# Patient Record
Sex: FEMALE | Race: WHITE | NOT HISPANIC OR LATINO | URBAN - METROPOLITAN AREA
[De-identification: names, ages, dates, MRNs, and addresses within clinical notes are randomized per-mention and may not be internally consistent; named-entity substitution may affect disease eponyms.]

---

## 2017-11-01 ENCOUNTER — OUTPATIENT (OUTPATIENT)
Dept: OUTPATIENT SERVICES | Facility: HOSPITAL | Age: 82
LOS: 1 days | End: 2017-11-01
Payer: MEDICARE

## 2017-11-01 PROCEDURE — G9001: CPT

## 2017-11-29 ENCOUNTER — INPATIENT (INPATIENT)
Facility: HOSPITAL | Age: 82
LOS: 4 days | Discharge: HOSPICE | End: 2017-12-04
Attending: INTERNAL MEDICINE | Admitting: INTERNAL MEDICINE
Payer: MEDICARE

## 2017-11-29 VITALS
RESPIRATION RATE: 23 BRPM | DIASTOLIC BLOOD PRESSURE: 71 MMHG | WEIGHT: 121.92 LBS | HEIGHT: 63 IN | HEART RATE: 85 BPM | SYSTOLIC BLOOD PRESSURE: 121 MMHG | OXYGEN SATURATION: 97 % | TEMPERATURE: 98 F

## 2017-11-29 LAB
ALBUMIN SERPL ELPH-MCNC: 3.2 G/DL — LOW (ref 3.3–5)
ALP SERPL-CCNC: 92 U/L — SIGNIFICANT CHANGE UP (ref 40–120)
ALT FLD-CCNC: 25 U/L — SIGNIFICANT CHANGE UP (ref 12–78)
ANION GAP SERPL CALC-SCNC: 5 MMOL/L — SIGNIFICANT CHANGE UP (ref 5–17)
APTT BLD: 30.2 SEC — SIGNIFICANT CHANGE UP (ref 27.5–37.4)
AST SERPL-CCNC: 14 U/L — LOW (ref 15–37)
BASE EXCESS BLDA CALC-SCNC: 7.9 MMOL/L — HIGH (ref -2–2)
BASOPHILS # BLD AUTO: 0.1 K/UL — SIGNIFICANT CHANGE UP (ref 0–0.2)
BASOPHILS NFR BLD AUTO: 1 % — SIGNIFICANT CHANGE UP (ref 0–2)
BILIRUB SERPL-MCNC: 0.8 MG/DL — SIGNIFICANT CHANGE UP (ref 0.2–1.2)
BLOOD GAS COMMENTS: SIGNIFICANT CHANGE UP
BLOOD GAS COMMENTS: SIGNIFICANT CHANGE UP
BLOOD GAS SOURCE: SIGNIFICANT CHANGE UP
BUN SERPL-MCNC: 28 MG/DL — HIGH (ref 7–23)
CALCIUM SERPL-MCNC: 9.5 MG/DL — SIGNIFICANT CHANGE UP (ref 8.5–10.1)
CHLORIDE SERPL-SCNC: 103 MMOL/L — SIGNIFICANT CHANGE UP (ref 96–108)
CK MB BLD-MCNC: 2.3 % — SIGNIFICANT CHANGE UP (ref 0–3.5)
CK MB CFR SERPL CALC: 0.7 NG/ML — SIGNIFICANT CHANGE UP (ref 0.5–3.6)
CK SERPL-CCNC: 30 U/L — SIGNIFICANT CHANGE UP (ref 26–192)
CO2 SERPL-SCNC: 33 MMOL/L — HIGH (ref 22–31)
CREAT SERPL-MCNC: 0.62 MG/DL — SIGNIFICANT CHANGE UP (ref 0.5–1.3)
EOSINOPHIL # BLD AUTO: 0.1 K/UL — SIGNIFICANT CHANGE UP (ref 0–0.5)
EOSINOPHIL NFR BLD AUTO: 1.7 % — SIGNIFICANT CHANGE UP (ref 0–6)
GLUCOSE SERPL-MCNC: 98 MG/DL — SIGNIFICANT CHANGE UP (ref 70–99)
HCO3 BLDA-SCNC: 34 MMOL/L — HIGH (ref 21–29)
HCT VFR BLD CALC: 38.5 % — SIGNIFICANT CHANGE UP (ref 34.5–45)
HGB BLD-MCNC: 12.8 G/DL — SIGNIFICANT CHANGE UP (ref 11.5–15.5)
HOROWITZ INDEX BLDA+IHG-RTO: 54 — SIGNIFICANT CHANGE UP
INR BLD: 1.04 RATIO — SIGNIFICANT CHANGE UP (ref 0.88–1.16)
LYMPHOCYTES # BLD AUTO: 1.9 K/UL — SIGNIFICANT CHANGE UP (ref 1–3.3)
LYMPHOCYTES # BLD AUTO: 24.7 % — SIGNIFICANT CHANGE UP (ref 13–44)
MCHC RBC-ENTMCNC: 28.2 PG — SIGNIFICANT CHANGE UP (ref 27–34)
MCHC RBC-ENTMCNC: 33.2 GM/DL — SIGNIFICANT CHANGE UP (ref 32–36)
MCV RBC AUTO: 84.8 FL — SIGNIFICANT CHANGE UP (ref 80–100)
MONOCYTES # BLD AUTO: 0.5 K/UL — SIGNIFICANT CHANGE UP (ref 0–0.9)
MONOCYTES NFR BLD AUTO: 7.1 % — SIGNIFICANT CHANGE UP (ref 2–14)
NEUTROPHILS # BLD AUTO: 5.1 K/UL — SIGNIFICANT CHANGE UP (ref 1.8–7.4)
NEUTROPHILS NFR BLD AUTO: 65.5 % — SIGNIFICANT CHANGE UP (ref 43–77)
NT-PROBNP SERPL-SCNC: 339 PG/ML — SIGNIFICANT CHANGE UP (ref 0–450)
PCO2 BLDA: 56 MMHG — HIGH (ref 32–46)
PH BLD: 7.4 — SIGNIFICANT CHANGE UP (ref 7.35–7.45)
PLATELET # BLD AUTO: 172 K/UL — SIGNIFICANT CHANGE UP (ref 150–400)
PO2 BLDA: 126 MMHG — HIGH (ref 74–108)
POTASSIUM SERPL-MCNC: 4.2 MMOL/L — SIGNIFICANT CHANGE UP (ref 3.5–5.3)
POTASSIUM SERPL-SCNC: 4.2 MMOL/L — SIGNIFICANT CHANGE UP (ref 3.5–5.3)
PROT SERPL-MCNC: 6.6 GM/DL — SIGNIFICANT CHANGE UP (ref 6–8.3)
PROTHROM AB SERPL-ACNC: 11.3 SEC — SIGNIFICANT CHANGE UP (ref 9.8–12.7)
RBC # BLD: 4.54 M/UL — SIGNIFICANT CHANGE UP (ref 3.8–5.2)
RBC # FLD: 12.9 % — SIGNIFICANT CHANGE UP (ref 11–15)
SAO2 % BLDA: 99 % — HIGH (ref 92–96)
SODIUM SERPL-SCNC: 141 MMOL/L — SIGNIFICANT CHANGE UP (ref 135–145)
TROPONIN I SERPL-MCNC: <.015 NG/ML — SIGNIFICANT CHANGE UP (ref 0.01–0.04)
WBC # BLD: 7.8 K/UL — SIGNIFICANT CHANGE UP (ref 3.8–10.5)
WBC # FLD AUTO: 7.8 K/UL — SIGNIFICANT CHANGE UP (ref 3.8–10.5)

## 2017-11-29 PROCEDURE — 99285 EMERGENCY DEPT VISIT HI MDM: CPT

## 2017-11-29 PROCEDURE — 71010: CPT | Mod: 26

## 2017-11-29 RX ORDER — IPRATROPIUM/ALBUTEROL SULFATE 18-103MCG
3 AEROSOL WITH ADAPTER (GRAM) INHALATION ONCE
Qty: 0 | Refills: 0 | Status: COMPLETED | OUTPATIENT
Start: 2017-11-29 | End: 2017-11-29

## 2017-11-29 RX ADMIN — Medication 3 MILLILITER(S): at 22:45

## 2017-11-29 RX ADMIN — Medication 40 MILLIGRAM(S): at 22:21

## 2017-11-29 RX ADMIN — Medication 3 MILLILITER(S): at 22:17

## 2017-11-29 NOTE — ED ADULT TRIAGE NOTE - CHIEF COMPLAINT QUOTE
as per family" patient has a snoring sound in her throat and had difficulty breathing earlier in the day"

## 2017-11-29 NOTE — ED PROVIDER NOTE - OBJECTIVE STATEMENT
Pertinent PMH/PSH/FHx/SHx and Review of Systems contained within:  Patient presents to the ED for   difficulty breathing.  Patient [place] speaking, family at bedside note that she has been having shortness of breath since earlier today, sitting up with increased nasal flaring and "snoring" noisy respirations.  Family and aid at bedside say that she has been having a cough for the last few days, patient has been getting robitussin.  No other factors out of the ordinary.      Relevant PMHx/SHx/SOCHx/FAMH:  Advanced dementia  Patient family denies EtOH/tobacco/illicit substance use.  PMD: Dr. Keyes

## 2017-11-29 NOTE — ED PROVIDER NOTE - MEDICAL DECISION MAKING DETAILS
Patient with stridorous breathing.  VSS, labs, CT's reviewed, left vallecula abnormal.  Needs ENT, speech and swallow.  Given recent cough, antibiotic coverage given initially, CT chest results reviewed.  Patient is to be admitted to the hospital and the case was discussed with the admitting physician.  Any changes in plan, additional imaging/labs, and further work up will be at the discretion of the admitting physician. Patient with stridorous breathing.  VSS, labs, CT's reviewed, left vallecula abnormal.  Needs ENT, speech and swallow, however per family patient has known scar tissue of left vallecula.  Given recent cough, antibiotic coverage given initially, CT chest results reviewed, suspicious for infection.  Patient is to be admitted to the hospital and the case was discussed with the admitting physician.  Any changes in plan, additional imaging/labs, and further work up will be at the discretion of the admitting physician.

## 2017-11-29 NOTE — ED PROVIDER NOTE - PHYSICAL EXAMINATION
Gen: Alert, sitting up, snorting breathing  Head: NC, AT, EOMI, normal lids/conjunctiva  ENT: patent oropharynx without erythema/exudate, uvula midline  Neck: +supple, no tenderness/meningismus/JVD, +Trachea midline  Pulm: Bilateral BS, increased resp effort, no wheeze, + retractions  CV: RRR, no M/R/G, +dist pulses  Abd: soft, NT/ND, +BS, no hepatosplenomegaly  Mskel: no edema/erythema/cyanosis  Skin: no rash, warm/dry  Neuro: no sensory/motor deficits, CN 2-12 intact

## 2017-11-30 DIAGNOSIS — R06.89 OTHER ABNORMALITIES OF BREATHING: ICD-10-CM

## 2017-11-30 DIAGNOSIS — F03.90 UNSPECIFIED DEMENTIA WITHOUT BEHAVIORAL DISTURBANCE: ICD-10-CM

## 2017-11-30 DIAGNOSIS — E89.0 POSTPROCEDURAL HYPOTHYROIDISM: Chronic | ICD-10-CM

## 2017-11-30 DIAGNOSIS — R69 ILLNESS, UNSPECIFIED: ICD-10-CM

## 2017-11-30 LAB
ALBUMIN SERPL ELPH-MCNC: 3 G/DL — LOW (ref 3.3–5)
ALP SERPL-CCNC: 84 U/L — SIGNIFICANT CHANGE UP (ref 40–120)
ALT FLD-CCNC: 24 U/L — SIGNIFICANT CHANGE UP (ref 12–78)
ANION GAP SERPL CALC-SCNC: 4 MMOL/L — LOW (ref 5–17)
AST SERPL-CCNC: 13 U/L — LOW (ref 15–37)
BILIRUB SERPL-MCNC: 0.9 MG/DL — SIGNIFICANT CHANGE UP (ref 0.2–1.2)
BUN SERPL-MCNC: 23 MG/DL — SIGNIFICANT CHANGE UP (ref 7–23)
CALCIUM SERPL-MCNC: 8.8 MG/DL — SIGNIFICANT CHANGE UP (ref 8.5–10.1)
CHLORIDE SERPL-SCNC: 108 MMOL/L — SIGNIFICANT CHANGE UP (ref 96–108)
CO2 SERPL-SCNC: 34 MMOL/L — HIGH (ref 22–31)
CREAT SERPL-MCNC: 0.55 MG/DL — SIGNIFICANT CHANGE UP (ref 0.5–1.3)
GLUCOSE SERPL-MCNC: 136 MG/DL — HIGH (ref 70–99)
HCT VFR BLD CALC: 38.8 % — SIGNIFICANT CHANGE UP (ref 34.5–45)
HGB BLD-MCNC: 12.4 G/DL — SIGNIFICANT CHANGE UP (ref 11.5–15.5)
MCHC RBC-ENTMCNC: 28 PG — SIGNIFICANT CHANGE UP (ref 27–34)
MCHC RBC-ENTMCNC: 31.9 GM/DL — LOW (ref 32–36)
MCV RBC AUTO: 88 FL — SIGNIFICANT CHANGE UP (ref 80–100)
PLATELET # BLD AUTO: 152 K/UL — SIGNIFICANT CHANGE UP (ref 150–400)
POTASSIUM SERPL-MCNC: 4 MMOL/L — SIGNIFICANT CHANGE UP (ref 3.5–5.3)
POTASSIUM SERPL-SCNC: 4 MMOL/L — SIGNIFICANT CHANGE UP (ref 3.5–5.3)
PROT SERPL-MCNC: 6.2 GM/DL — SIGNIFICANT CHANGE UP (ref 6–8.3)
RBC # BLD: 4.42 M/UL — SIGNIFICANT CHANGE UP (ref 3.8–5.2)
RBC # FLD: 13.7 % — SIGNIFICANT CHANGE UP (ref 11–15)
SODIUM SERPL-SCNC: 146 MMOL/L — HIGH (ref 135–145)
WBC # BLD: 9.3 K/UL — SIGNIFICANT CHANGE UP (ref 3.8–10.5)
WBC # FLD AUTO: 9.3 K/UL — SIGNIFICANT CHANGE UP (ref 3.8–10.5)

## 2017-11-30 PROCEDURE — 70450 CT HEAD/BRAIN W/O DYE: CPT | Mod: 26

## 2017-11-30 PROCEDURE — 71250 CT THORAX DX C-: CPT | Mod: 26

## 2017-11-30 PROCEDURE — 70490 CT SOFT TISSUE NECK W/O DYE: CPT | Mod: 26

## 2017-11-30 RX ORDER — ENOXAPARIN SODIUM 100 MG/ML
30 INJECTION SUBCUTANEOUS EVERY 24 HOURS
Qty: 0 | Refills: 0 | Status: DISCONTINUED | OUTPATIENT
Start: 2017-11-30 | End: 2017-12-04

## 2017-11-30 RX ORDER — ACETYLCYSTEINE 200 MG/ML
10 VIAL (ML) MISCELLANEOUS THREE TIMES A DAY
Qty: 0 | Refills: 0 | Status: DISCONTINUED | OUTPATIENT
Start: 2017-11-30 | End: 2017-11-30

## 2017-11-30 RX ORDER — SODIUM CHLORIDE 9 MG/ML
1000 INJECTION INTRAMUSCULAR; INTRAVENOUS; SUBCUTANEOUS ONCE
Qty: 0 | Refills: 0 | Status: COMPLETED | OUTPATIENT
Start: 2017-11-30 | End: 2017-11-30

## 2017-11-30 RX ORDER — ACETYLCYSTEINE 200 MG/ML
2 VIAL (ML) MISCELLANEOUS EVERY 6 HOURS
Qty: 0 | Refills: 0 | Status: DISCONTINUED | OUTPATIENT
Start: 2017-11-30 | End: 2017-12-04

## 2017-11-30 RX ORDER — ACETYLCYSTEINE 200 MG/ML
10 VIAL (ML) MISCELLANEOUS EVERY 6 HOURS
Qty: 0 | Refills: 0 | Status: DISCONTINUED | OUTPATIENT
Start: 2017-11-30 | End: 2017-11-30

## 2017-11-30 RX ORDER — MORPHINE SULFATE 50 MG/1
5 CAPSULE, EXTENDED RELEASE ORAL
Qty: 0 | Refills: 0 | Status: DISCONTINUED | OUTPATIENT
Start: 2017-11-30 | End: 2017-12-02

## 2017-11-30 RX ORDER — IPRATROPIUM/ALBUTEROL SULFATE 18-103MCG
3 AEROSOL WITH ADAPTER (GRAM) INHALATION EVERY 6 HOURS
Qty: 0 | Refills: 0 | Status: DISCONTINUED | OUTPATIENT
Start: 2017-11-30 | End: 2017-12-04

## 2017-11-30 RX ADMIN — Medication 3 MILLILITER(S): at 11:10

## 2017-11-30 RX ADMIN — Medication 40 MILLIGRAM(S): at 06:22

## 2017-11-30 RX ADMIN — ENOXAPARIN SODIUM 30 MILLIGRAM(S): 100 INJECTION SUBCUTANEOUS at 06:22

## 2017-11-30 RX ADMIN — Medication 40 MILLIGRAM(S): at 22:02

## 2017-11-30 RX ADMIN — Medication 40 MILLIGRAM(S): at 13:26

## 2017-11-30 RX ADMIN — Medication 2 MILLILITER(S): at 17:40

## 2017-11-30 RX ADMIN — Medication 3 MILLILITER(S): at 17:40

## 2017-11-30 RX ADMIN — SODIUM CHLORIDE 1000 MILLILITER(S): 9 INJECTION INTRAMUSCULAR; INTRAVENOUS; SUBCUTANEOUS at 02:31

## 2017-11-30 RX ADMIN — Medication 3 MILLILITER(S): at 06:04

## 2017-11-30 NOTE — H&P ADULT - NSHPPHYSICALEXAM_GEN_ALL_CORE
PHYSICAL EXAM:    GENERAL: NAD, well-groomed, well-developed  HEAD:  Atraumatic, Normocephalic  EYES: EOMI, PERRLA, conjunctiva and sclera clear  ENMT: No tonsillar erythema, exudates,  NECK: Supple, No JVD, Normal thyroid  NERVOUS SYSTEM:  somnolent, poorly  arouseable  CHEST/LUNG: Clear  bilaterally; No rales, rhonchi, wheezing, or rubs  HEART: Regular rate and rhythm; No murmurs, rubs, or gallops  ABDOMEN: Soft, Nontender, Nondistended; no  masses Bowel sounds present  EXTREMITIES:  + Peripheral Pulses, No clubbing, cyanosis, or edema  LYMPH: No lymphadenopathy noted   RECTAL: deferreds noted   SKIN: No rashes or lesions

## 2017-11-30 NOTE — CHART NOTE - NSCHARTNOTEFT_GEN_A_CORE
Medicine Hospitalist PA    Asked to place admission orders. Pt seen and examined in ED. Family at bedside. Pt awake but lethargic. As per family, pt came in for sob that started today. Pt "snoring", as per family pt was walking, talking with walker last week, however this last week notably seemed weaker. Eating relatively okay as per family however today much weaker, unable to respond to verbal commands.     Vital Signs Last 24 Hrs  T(C): 37.3 (29 Nov 2017 23:01), Max: 37.3 (29 Nov 2017 23:01)  T(F): 99.2 (29 Nov 2017 23:01), Max: 99.2 (29 Nov 2017 23:01)  HR: 88 (29 Nov 2017 23:01) (85 - 88)  BP: 163/76 (29 Nov 2017 23:01) (121/71 - 163/76)  RR: 20 (29 Nov 2017 23:01) (20 - 23)  SpO2: 99% (29 Nov 2017 23:01) (97% - 99%)    General: awake but lethargic, not responding to verbal commands  CV: S1 S2  Resp: stridor, wheezing  Abd: soft, NT/ND, BS+  Ext: pulses intact, no LE edema    A/P: 86 YOF with PMH x of Dementia now admitted with AMS.  - Admit to Med/Surg  - Levaquin 500mg qdaily  - Duonebs  - Aspiration Precautions  - NPO until speech and swallow  - Pending CT head  - Pending CT chest  - Pending CT neck for stridor    Discussed with Dr. Keyes who agrees with plan. Medicine Hospitalist PA    Asked to place admission orders. Pt seen and examined in ED. Family at bedside. Pt awake but lethargic. As per family, pt came in for sob that started today. Pt "snoring", as per family pt was walking, talking with walker last week, however this last week notably seemed weaker. Eating relatively okay as per family however today much weaker, unable to respond to verbal commands.     Vital Signs Last 24 Hrs  T(C): 37.3 (29 Nov 2017 23:01), Max: 37.3 (29 Nov 2017 23:01)  T(F): 99.2 (29 Nov 2017 23:01), Max: 99.2 (29 Nov 2017 23:01)  HR: 88 (29 Nov 2017 23:01) (85 - 88)  BP: 163/76 (29 Nov 2017 23:01) (121/71 - 163/76)  RR: 20 (29 Nov 2017 23:01) (20 - 23)  SpO2: 99% (29 Nov 2017 23:01) (97% - 99%)    General: awake but lethargic, not responding to verbal commands  CV: S1 S2  Resp: stridor, wheezing  Abd: soft, NT/ND, BS+  Ext: pulses intact, no LE edema    A/P: 86 YOF DNR/DNI with PMHx of Dementia now admitted with AMS.  - Admit to Med/Surg  - Levaquin 500mg qdaily  - Duonebs  - Aspiration Precautions  - NPO until speech and swallow  - Pending CT head  - Pending CT chest  - Pending CT neck for stridor    Discussed with Dr. Keyes who agrees with plan.

## 2017-11-30 NOTE — H&P ADULT - HISTORY OF PRESENT ILLNESS
patient  with  end  stage  Alzheimer's  reported  with  difficulty  breathing stridor   at  home  evaluated  in  ER  admitted  for  further w/u  and  treatment

## 2017-11-30 NOTE — H&P ADULT - NSHPLABSRESULTS_GEN_ALL_CORE
LABS:                        12.4   9.3   )-----------( 152      ( 30 Nov 2017 08:03 )             38.8     11-30    146<H>  |  108  |  23  ----------------------------<  136<H>  4.0   |  34<H>  |  0.55    Ca    8.8      30 Nov 2017 08:03    TPro  6.2  /  Alb  3.0<L>  /  TBili  0.9  /  DBili  x   /  AST  13<L>  /  ALT  24  /  AlkPhos  84  11-30    PT/INR - ( 29 Nov 2017 22:44 )   PT: 11.3 sec;   INR: 1.04 ratio         PTT - ( 29 Nov 2017 22:44 )  PTT:30.2 sec

## 2017-12-01 DIAGNOSIS — I10 ESSENTIAL (PRIMARY) HYPERTENSION: ICD-10-CM

## 2017-12-01 RX ORDER — BACITRACIN ZINC 500 UNIT/G
1 OINTMENT IN PACKET (EA) TOPICAL
Qty: 0 | Refills: 0 | Status: DISCONTINUED | OUTPATIENT
Start: 2017-12-01 | End: 2017-12-04

## 2017-12-01 RX ADMIN — Medication 2 MILLILITER(S): at 00:09

## 2017-12-01 RX ADMIN — Medication 40 MILLIGRAM(S): at 14:56

## 2017-12-01 RX ADMIN — Medication 40 MILLIGRAM(S): at 21:58

## 2017-12-01 RX ADMIN — Medication 2 MILLILITER(S): at 17:05

## 2017-12-01 RX ADMIN — Medication 2 MILLILITER(S): at 11:07

## 2017-12-01 RX ADMIN — Medication 3 MILLILITER(S): at 17:05

## 2017-12-01 RX ADMIN — Medication 3 MILLILITER(S): at 05:28

## 2017-12-01 RX ADMIN — Medication 40 MILLIGRAM(S): at 05:19

## 2017-12-01 RX ADMIN — Medication 2 MILLILITER(S): at 05:28

## 2017-12-01 RX ADMIN — Medication 3 MILLILITER(S): at 00:10

## 2017-12-01 RX ADMIN — Medication 3 MILLILITER(S): at 11:07

## 2017-12-01 RX ADMIN — ENOXAPARIN SODIUM 30 MILLIGRAM(S): 100 INJECTION SUBCUTANEOUS at 05:20

## 2017-12-01 NOTE — SWALLOW BEDSIDE ASSESSMENT ADULT - PHARYNGEAL PHASE
Decreased laryngeal elevation/Multiple swallows Multiple swallows/Decreased laryngeal elevation Within functional limits

## 2017-12-01 NOTE — CONSULT NOTE ADULT - SUBJECTIVE AND OBJECTIVE BOX
Patient is a 86y old  Female who presents with a chief complaint of difficulty breathing  change  in  mental  status (30 Nov 2017 08:48)    HPI: Patient  with  end  stage  Alzheimer's  reported  with  difficulty  breathing, stridor at  home - evaluated  in  ER,  admitted  for  further w/u  and  treatment (30 Nov 2017 08:48)    Treated with steroids, bronchdilators, mucolytics, O2 and ABx empirically.    REVIEW OF SYSTEMS as above  Constitutional - No fever, No weight loss, No fatigue  HEENT - No neck pain  Respiratory - No cough, No shortness of breath  Cardiovascular - No chest pain, No palpitations  Gastrointestinal - No abdominal pain, No nausea, No vomiting  Genitourinary - No dysuria, No frequency  Neurological - No headaches, No loss of strength, No numbness, No tremors  Skin - No lesions   Endocrine - No temperature intolerance  Musculoskeletal - No joint pain  Psychiatric - No depression, No anxiety    PAST MEDICAL & SURGICAL HISTORY  HTN (hypertension)  Dementia  H/O partial thyroidectomy  H/O thyroidectomy    FAMILY HISTORY: noncontributory    SOCHX: Lives at home with   Np tobacco,  -  alcohol    ALLERGIES  No Known Allergies    MEDICATIONS reviewed  MEDICATIONS  (STANDING):  acetylcysteine 10% Inhalation 2 milliLiter(s) Inhalation every 6 hours  ALBUTerol/ipratropium for Nebulization 3 milliLiter(s) Nebulizer every 6 hours  enoxaparin Injectable 30 milliGRAM(s) SubCutaneous every 24 hours  levoFLOXacin IVPB 500 milliGRAM(s) IV Intermittent every 24 hours  methylPREDNISolone sodium succinate Injectable 40 milliGRAM(s) IV Push every 8 hours    MEDICATIONS  (PRN):  morphine Concentrate 5 milliGRAM(s) Oral four times a day PRN Moderate Pain (4 - 6)    --------------------------------------------------------------------  VITALS  T(C): 36.2 (12-01-17 @ 11:47), Max: 37.7 (11-30-17 @ 23:50)  HR: 78 (12-01-17 @ 11:47) (63 - 94)  BP: 131/86 (12-01-17 @ 11:47) (94/42 - 131/86)  RR: 17 (12-01-17 @ 11:47) (17 - 18)  SpO2: 98% (12-01-17 @ 11:47) (94% - 99%)  Wt(kg): -- 55.6 kg    PHYSICAL EXAM BMI 21.7  Constitutional - NAD, Comfortable  HEENT - NCAT, EOMI  Neck - Supple, functional ROM  Cardiovascular - RRR  Abdomen - Soft, Not tender  Extremities - Functional range of motion   Neurologic -                    Cognitive - Awake, Alert, Oriented     Speech Intact     Language  Intact     Motor - No focal deficits     Sensory - Intact to LT     Reflexes - DTR Intact, No primitive reflexive     Psychiatric - Mood stable, Affect WNL    RECENT LABS reviewed  CBC Full  -  ( 30 Nov 2017 08:03 )  WBC Count : 9.3 K/uL  Hemoglobin : 12.4 g/dL  Hematocrit : 38.8 %  Platelet Count - Automated : 152 K/uL  Mean Cell Volume : 88.0 fl  Mean Cell Hemoglobin : 28.0 pg  Mean Cell Hemoglobin Concentration : 31.9 gm/dL    11-30    146<H>  |  108  |  23  ----------------------------<  136<H>  4.0   |  34<H>  |  0.55    Ca    8.8      30 Nov 2017 08:03    TPro  6.2  /  Alb  3.0<L>  /  TBili  0.9  /  DBili  x   /  AST  13<L>  /  ALT  24  /  AlkPhos  84  11-30    IMAGING reviewed:  CXR - Right lower lobe clustered groundglass and tree-in-bud nodular  opacities - infection versus inflammatory mucoid impacted distal airways.    Head CT - no acute findings    STx rec puree consistency with thin liquids    FUNCTIONAL HISTORY    CURRENT FUNCTIONAL STATUS    IMPRESSION:     PLAN: Will follow - recommendations will depend upon clinical and functional course. Patient is a 86y old  Female who presents with a chief complaint of difficulty breathing  change  in  mental  status (30 Nov 2017 08:48)    HPI: Patient  with  end  stage  Alzheimer's  reported  with  difficulty  breathing, stridor at  home - evaluated  in  ER,  admitted  for  further w/u  and  treatment (30 Nov 2017 08:48)    Treated with steroids, bronchdilators, mucolytics, O2 and ABx empirically.    REVIEW OF SYSTEMS limited by dementia - as above.  Relative states she is breathing and looking much better today.    PAST MEDICAL & SURGICAL HISTORY  HTN (hypertension)  Dementia  H/O partial thyroidectomy  H/O thyroidectomy    FAMILY HISTORY: noncontributory    SOCHX: Lives at home with family - speaks Libyan, but demented - a relative translated. Was able to participate in ADLs, ambulated in house with walker.  No tobacco,  -  alcohol    ALLERGIES  No Known Allergies    MEDICATIONS reviewed  MEDICATIONS  (STANDING):  acetylcysteine 10% Inhalation 2 milliLiter(s) Inhalation every 6 hours  ALBUTerol/ipratropium for Nebulization 3 milliLiter(s) Nebulizer every 6 hours  enoxaparin Injectable 30 milliGRAM(s) SubCutaneous every 24 hours  levoFLOXacin IVPB 500 milliGRAM(s) IV Intermittent every 24 hours  methylPREDNISolone sodium succinate Injectable 40 milliGRAM(s) IV Push every 8 hours    MEDICATIONS  (PRN):  morphine Concentrate 5 milliGRAM(s) Oral four times a day PRN Moderate Pain (4 - 6)    --------------------------------------------------------------------  VITALS  T(C): 36.2 (12-01-17 @ 11:47), Max: 37.7 (11-30-17 @ 23:50)  HR: 78 (12-01-17 @ 11:47) (63 - 94)  BP: 131/86 (12-01-17 @ 11:47) (94/42 - 131/86)  RR: 17 (12-01-17 @ 11:47) (17 - 18)  SpO2: 98% (12-01-17 @ 11:47) (94% - 99%)  Wt(kg): -- 55.6 kg    PHYSICAL EXAM BMI 21.7  Constitutional - NAD, Comfortable  HEENT - NCAT, EOMI, O2 NC (new)  Neck - Supple, functional ROM  Cardiovascular - RRR  Abdomen - Soft, Not tender  Extremities - Grossly functional range of motion - testing limited.  Neurologic -                    Cognitive - Awake, Alert, follows simple directions in [place]     Speech grossly Intact     Language grossly  Intact     Motor - No focal deficits appreciated     Sensory - unreliable     Reflexes - DTR absent BLEs     Psychiatric - Mood stable    RECENT LABS reviewed  CBC Full  -  ( 30 Nov 2017 08:03 )  WBC Count : 9.3 K/uL  Hemoglobin : 12.4 g/dL  Hematocrit : 38.8 %  Platelet Count - Automated : 152 K/uL  Mean Cell Volume : 88.0 fl  Mean Cell Hemoglobin : 28.0 pg  Mean Cell Hemoglobin Concentration : 31.9 gm/dL    11-30    146<H>  |  108  |  23  ----------------------------<  136<H>  4.0   |  34<H>  |  0.55    Ca    8.8      30 Nov 2017 08:03    TPro  6.2  /  Alb  3.0<L>  /  TBili  0.9  /  DBili  x   /  AST  13<L>  /  ALT  24  /  AlkPhos  84  11-30    IMAGING reviewed:  CXR - Right lower lobe clustered groundglass and tree-in-bud nodular  opacities - infection versus inflammatory mucoid impacted distal airways.    Head CT - no acute findings    STx rec puree consistency with thin liquids    FUNCTIONAL HISTORY was able to amb with RW    CURRENT FUNCTIONAL STATUS to be determined    IMPRESSION: 86 F with dementia but was able to ambulate, being treated for difficulty breathing - improving with empirical treatment.    PLAN: Will follow - recommendations will depend upon clinical and functional course.

## 2017-12-01 NOTE — CONSULT NOTE ADULT - SUBJECTIVE AND OBJECTIVE BOX
Vascular Attending:   Pt seen and examined      HPI:  patient  with  end  stage  Alzheimer's  reported  with  difficulty  breathing stridor   at  home  evaluated  in  ER  admitted  for  further w/u  and  treatment (30 Nov 2017 08:48)      PAST MEDICAL & SURGICAL HISTORY:  HTN (hypertension)  Dementia  H/O partial thyroidectomy        MEDICATIONS  (STANDING):  acetylcysteine 10% Inhalation 2 milliLiter(s) Inhalation every 6 hours  ALBUTerol/ipratropium for Nebulization 3 milliLiter(s) Nebulizer every 6 hours  enoxaparin Injectable 30 milliGRAM(s) SubCutaneous every 24 hours  levoFLOXacin IVPB 500 milliGRAM(s) IV Intermittent every 24 hours  methylPREDNISolone sodium succinate Injectable 40 milliGRAM(s) IV Push every 8 hours    MEDICATIONS  (PRN):  morphine Concentrate 5 milliGRAM(s) Oral four times a day PRN Moderate Pain (4 - 6)      Allergies    No Known Allergies    Intolerances        SOCIAL HISTORY:      Vital Signs Last 24 Hrs  T(C): 36.2 (01 Dec 2017 11:47), Max: 37.7 (30 Nov 2017 23:50)  T(F): 97.2 (01 Dec 2017 11:47), Max: 99.8 (30 Nov 2017 23:50)  HR: 78 (01 Dec 2017 11:47) (63 - 94)  BP: 131/86 (01 Dec 2017 11:47) (94/42 - 131/86)  BP(mean): --  RR: 17 (01 Dec 2017 11:47) (17 - 18)  SpO2: 98% (01 Dec 2017 11:47) (94% - 99%)    P/E:-   CAROTIDS:- Bilateral carotids with no Bruits. No scars of previous catheterisation.  UPPER EXTREMITIES:- Bilateral radial artery pulses are normal and no ischemia of the Hands. No edema of the arms.  ABDOMEN:- No pulsatile mass in the abdomen and no ascites.  LOWER EXTREMITIES:- Bilateral LE with No Edema and no CVI, No varicose veins, no ulcers.The arterial pulse are examined with palpation and Dopplers and the findings are as follows,    Wounds:- The pt has following wounds and measurements.  Sacrum:- Stage 4, 1x1 cm on coccyx, probes to bone and has no tunneling and no undermining, no cellulitis.  NO slough and no need for the debridement.      LABS:                        12.4   9.3   )-----------( 152      ( 30 Nov 2017 08:03 )             38.8     11-30    146<H>  |  108  |  23  ----------------------------<  136<H>  4.0   |  34<H>  |  0.55    Ca    8.8      30 Nov 2017 08:03    TPro  6.2  /  Alb  3.0<L>  /  TBili  0.9  /  DBili  x   /  AST  13<L>  /  ALT  24  /  AlkPhos  84  11-30    PT/INR - ( 29 Nov 2017 22:44 )   PT: 11.3 sec;   INR: 1.04 ratio         PTT - ( 29 Nov 2017 22:44 )  PTT:30.2 sec      RADIOLOGY & ADDITIONAL STUDIES    Impression and Plan: stage 4 coccygeal pressure ulcer,  Plan--- off loading, foam dressing, No debridement needed.Poor healing expected due to poor overall med condition.

## 2017-12-01 NOTE — SWALLOW BEDSIDE ASSESSMENT ADULT - COMMENTS
CT CHEST: 11/30/2017: IMPRESSION: Right lower lobe clustered groundglass and tree-in-bud nodular opacities. Differential includes infection versus inflammatory mucoid impacted distal airways.    CXR: 11/29/2017: no infiltrates are seen.    CT BRAIN: 11/29/2017: IMPRESSION: No acute intracranial bleeding, mass effect, or shift. Volume loss and chronic microvascular ischemic changes.

## 2017-12-01 NOTE — SWALLOW BEDSIDE ASSESSMENT ADULT - SLP GENERAL OBSERVATIONS
[place] [facility] pt received at bedside alert and cooperative. Pt is Greek speaking. Pt aide able to translate information. Pt aide reports pt stating desire to consume PO. During swallow evaluation, able to follow one step directions when repeated. Pt consistency requested "more" during evaluation.

## 2017-12-01 NOTE — SWALLOW BEDSIDE ASSESSMENT ADULT - SWALLOW EVAL: DIAGNOSIS
Pt presented at bedside with reduced cognition, secondary to dementia. Pt oropharyngeal stages marked by prolonged A-P transport, oral holding, delayed swallow trigger, decreased laryngeal excursion, multiple swallows. No overt s/s aspiration noted.

## 2017-12-01 NOTE — SWALLOW BEDSIDE ASSESSMENT ADULT - SLP PERTINENT HISTORY OF CURRENT PROBLEM
Difficulty breathing  change  in  mental  status; patient  with  end  stage  Alzheimer's  reported  with  difficulty  breathing stridor   at  home  evaluated  in  ER  admitted  for  further w/u  and  treatment

## 2017-12-01 NOTE — PROGRESS NOTE ADULT - SUBJECTIVE AND OBJECTIVE BOX
INTERVAL HPI/OVERNIGHT EVENTS:        REVIEW OF SYSTEMS:  CONSTITUTIONAL:  patient  alert smiling    MEDICATION:  acetylcysteine 10% Inhalation 2 milliLiter(s) Inhalation every 6 hours  ALBUTerol/ipratropium for Nebulization 3 milliLiter(s) Nebulizer every 6 hours  enoxaparin Injectable 30 milliGRAM(s) SubCutaneous every 24 hours  levoFLOXacin IVPB 500 milliGRAM(s) IV Intermittent every 24 hours  methylPREDNISolone sodium succinate Injectable 40 milliGRAM(s) IV Push every 8 hours  morphine Concentrate 5 milliGRAM(s) Oral four times a day PRN    Vital Signs Last 24 Hrs  T(C): 37.5 (01 Dec 2017 05:37), Max: 37.7 (30 Nov 2017 23:50)  T(F): 99.5 (01 Dec 2017 05:37), Max: 99.8 (30 Nov 2017 23:50)  HR: 78 (01 Dec 2017 06:09) (63 - 94)  BP: 120/72 (01 Dec 2017 05:37) (94/42 - 129/66)  BP(mean): --  RR: 18 (01 Dec 2017 05:37) (18 - 18)  SpO2: 97% (01 Dec 2017 06:09) (94% - 99%)    PHYSICAL EXAM:  GENERAL: NAD, well-groomed, well-developed  EYES:  conjunctiva and sclera clear  ENMT:  Moist mucous membranes,   NECK: Supple, No JVD, Normal thyroid  NERVOUS SYSTEM:  Alert moves  all  extr  CHEST/LUNG: Clear    HEART: Regular rate and rhythm; No murmurs, rubs, or gallops  ABDOMEN: Soft, Nontender, Nondistended; Bowel sounds present  EXTREMITIES:  no  edema no  tenderness  SKIN: No rashes   LABS:                        12.4   9.3   )-----------( 152      ( 30 Nov 2017 08:03 )             38.8     11-30    146<H>  |  108  |  23  ----------------------------<  136<H>  4.0   |  34<H>  |  0.55    Ca    8.8      30 Nov 2017 08:03    TPro  6.2  /  Alb  3.0<L>  /  TBili  0.9  /  DBili  x   /  AST  13<L>  /  ALT  24  /  AlkPhos  84  11-30    PT/INR - ( 29 Nov 2017 22:44 )   PT: 11.3 sec;   INR: 1.04 ratio         PTT - ( 29 Nov 2017 22:44 )  PTT:30.2 sec    CAPILLARY BLOOD GLUCOSE          RADIOLOGY & ADDITIONAL TESTS:    Imaging reports  Personally Reviewed:  [x ] YES  [ ] NO    Consultant(s) Notes Reviewed:  [ ] YES  [ ] NO    Care Discussed with Consultants/Other Providers [x ] YES  [ ] NO

## 2017-12-01 NOTE — SWALLOW BEDSIDE ASSESSMENT ADULT - ORAL PHASE
Delayed oral transit time/Decreased anterior-posterior movement of the bolus Delayed oral transit time Within functional limits

## 2017-12-01 NOTE — SWALLOW BEDSIDE ASSESSMENT ADULT - SWALLOW EVAL: RECOMMENDED FEEDING/EATING TECHNIQUES
allow for swallow between intakes/alternate food with liquid/small sips/bites/maintain upright posture during/after eating for 30 mins/no straws/crush medication (when feasible)/position upright (90 degrees)

## 2017-12-02 LAB — PREALB SERPL-MCNC: 14 MG/DL — LOW (ref 18–45)

## 2017-12-02 RX ORDER — MORPHINE SULFATE 50 MG/1
5 CAPSULE, EXTENDED RELEASE ORAL
Qty: 0 | Refills: 0 | Status: DISCONTINUED | OUTPATIENT
Start: 2017-12-02 | End: 2017-12-04

## 2017-12-02 RX ORDER — LACTULOSE 10 G/15ML
20 SOLUTION ORAL ONCE
Qty: 0 | Refills: 0 | Status: COMPLETED | OUTPATIENT
Start: 2017-12-02 | End: 2017-12-02

## 2017-12-02 RX ADMIN — ENOXAPARIN SODIUM 30 MILLIGRAM(S): 100 INJECTION SUBCUTANEOUS at 06:00

## 2017-12-02 RX ADMIN — Medication 3 MILLILITER(S): at 05:13

## 2017-12-02 RX ADMIN — MORPHINE SULFATE 5 MILLIGRAM(S): 50 CAPSULE, EXTENDED RELEASE ORAL at 12:45

## 2017-12-02 RX ADMIN — Medication 3 MILLILITER(S): at 11:07

## 2017-12-02 RX ADMIN — Medication 1 APPLICATION(S): at 06:00

## 2017-12-02 RX ADMIN — MORPHINE SULFATE 5 MILLIGRAM(S): 50 CAPSULE, EXTENDED RELEASE ORAL at 11:47

## 2017-12-02 RX ADMIN — MORPHINE SULFATE 5 MILLIGRAM(S): 50 CAPSULE, EXTENDED RELEASE ORAL at 18:03

## 2017-12-02 RX ADMIN — Medication 2 MILLILITER(S): at 17:40

## 2017-12-02 RX ADMIN — Medication 3 MILLILITER(S): at 00:00

## 2017-12-02 RX ADMIN — LACTULOSE 20 GRAM(S): 10 SOLUTION ORAL at 14:17

## 2017-12-02 RX ADMIN — MORPHINE SULFATE 5 MILLIGRAM(S): 50 CAPSULE, EXTENDED RELEASE ORAL at 17:06

## 2017-12-02 RX ADMIN — Medication 3 MILLILITER(S): at 23:06

## 2017-12-02 RX ADMIN — Medication 3 MILLILITER(S): at 17:40

## 2017-12-02 RX ADMIN — Medication 2 MILLILITER(S): at 05:14

## 2017-12-02 RX ADMIN — Medication 2 MILLILITER(S): at 11:07

## 2017-12-02 RX ADMIN — Medication 1 APPLICATION(S): at 17:08

## 2017-12-02 RX ADMIN — Medication 30 MILLIGRAM(S): at 10:10

## 2017-12-02 RX ADMIN — Medication 40 MILLIGRAM(S): at 06:00

## 2017-12-02 RX ADMIN — Medication 2 MILLILITER(S): at 23:06

## 2017-12-02 RX ADMIN — Medication 2 MILLILITER(S): at 00:01

## 2017-12-03 LAB
ALBUMIN SERPL ELPH-MCNC: 2.9 G/DL — LOW (ref 3.3–5)
ALP SERPL-CCNC: 70 U/L — SIGNIFICANT CHANGE UP (ref 40–120)
ALT FLD-CCNC: 22 U/L — SIGNIFICANT CHANGE UP (ref 12–78)
ANION GAP SERPL CALC-SCNC: 4 MMOL/L — LOW (ref 5–17)
AST SERPL-CCNC: 12 U/L — LOW (ref 15–37)
BILIRUB SERPL-MCNC: 0.5 MG/DL — SIGNIFICANT CHANGE UP (ref 0.2–1.2)
BUN SERPL-MCNC: 29 MG/DL — HIGH (ref 7–23)
CALCIUM SERPL-MCNC: 8.7 MG/DL — SIGNIFICANT CHANGE UP (ref 8.5–10.1)
CHLORIDE SERPL-SCNC: 105 MMOL/L — SIGNIFICANT CHANGE UP (ref 96–108)
CO2 SERPL-SCNC: 35 MMOL/L — HIGH (ref 22–31)
CREAT SERPL-MCNC: 0.44 MG/DL — LOW (ref 0.5–1.3)
GLUCOSE SERPL-MCNC: 84 MG/DL — SIGNIFICANT CHANGE UP (ref 70–99)
HCT VFR BLD CALC: 38.2 % — SIGNIFICANT CHANGE UP (ref 34.5–45)
HGB BLD-MCNC: 12.1 G/DL — SIGNIFICANT CHANGE UP (ref 11.5–15.5)
MCHC RBC-ENTMCNC: 27.1 PG — SIGNIFICANT CHANGE UP (ref 27–34)
MCHC RBC-ENTMCNC: 31.7 GM/DL — LOW (ref 32–36)
MCV RBC AUTO: 85.5 FL — SIGNIFICANT CHANGE UP (ref 80–100)
PLATELET # BLD AUTO: 162 K/UL — SIGNIFICANT CHANGE UP (ref 150–400)
POTASSIUM SERPL-MCNC: 3.3 MMOL/L — LOW (ref 3.5–5.3)
POTASSIUM SERPL-SCNC: 3.3 MMOL/L — LOW (ref 3.5–5.3)
PROT SERPL-MCNC: 6.1 GM/DL — SIGNIFICANT CHANGE UP (ref 6–8.3)
RBC # BLD: 4.46 M/UL — SIGNIFICANT CHANGE UP (ref 3.8–5.2)
RBC # FLD: 13.6 % — SIGNIFICANT CHANGE UP (ref 11–15)
SODIUM SERPL-SCNC: 144 MMOL/L — SIGNIFICANT CHANGE UP (ref 135–145)
WBC # BLD: 7.9 K/UL — SIGNIFICANT CHANGE UP (ref 3.8–10.5)
WBC # FLD AUTO: 7.9 K/UL — SIGNIFICANT CHANGE UP (ref 3.8–10.5)

## 2017-12-03 RX ORDER — POTASSIUM CHLORIDE 20 MEQ
40 PACKET (EA) ORAL EVERY 6 HOURS
Qty: 0 | Refills: 0 | Status: COMPLETED | OUTPATIENT
Start: 2017-12-03 | End: 2017-12-04

## 2017-12-03 RX ORDER — POTASSIUM CHLORIDE 20 MEQ
40 PACKET (EA) ORAL EVERY 4 HOURS
Qty: 0 | Refills: 0 | Status: DISCONTINUED | OUTPATIENT
Start: 2017-12-03 | End: 2017-12-03

## 2017-12-03 RX ORDER — MEMANTINE HYDROCHLORIDE 10 MG/1
10 TABLET ORAL
Qty: 0 | Refills: 0 | Status: DISCONTINUED | OUTPATIENT
Start: 2017-12-03 | End: 2017-12-04

## 2017-12-03 RX ORDER — ASCORBIC ACID 60 MG
500 TABLET,CHEWABLE ORAL DAILY
Qty: 0 | Refills: 0 | Status: DISCONTINUED | OUTPATIENT
Start: 2017-12-03 | End: 2017-12-04

## 2017-12-03 RX ORDER — ZINC SULFATE TAB 220 MG (50 MG ZINC EQUIVALENT) 220 (50 ZN) MG
220 TAB ORAL DAILY
Qty: 0 | Refills: 0 | Status: DISCONTINUED | OUTPATIENT
Start: 2017-12-03 | End: 2017-12-04

## 2017-12-03 RX ORDER — DONEPEZIL HYDROCHLORIDE 10 MG/1
10 TABLET, FILM COATED ORAL AT BEDTIME
Qty: 0 | Refills: 0 | Status: DISCONTINUED | OUTPATIENT
Start: 2017-12-03 | End: 2017-12-04

## 2017-12-03 RX ADMIN — Medication 1 APPLICATION(S): at 05:28

## 2017-12-03 RX ADMIN — Medication 30 MILLIGRAM(S): at 05:29

## 2017-12-03 RX ADMIN — Medication 3 MILLILITER(S): at 23:20

## 2017-12-03 RX ADMIN — Medication 3 MILLILITER(S): at 06:01

## 2017-12-03 RX ADMIN — Medication 500 MILLIGRAM(S): at 12:22

## 2017-12-03 RX ADMIN — Medication 1 APPLICATION(S): at 18:00

## 2017-12-03 RX ADMIN — DONEPEZIL HYDROCHLORIDE 10 MILLIGRAM(S): 10 TABLET, FILM COATED ORAL at 22:01

## 2017-12-03 RX ADMIN — Medication 2 MILLILITER(S): at 06:01

## 2017-12-03 RX ADMIN — ENOXAPARIN SODIUM 30 MILLIGRAM(S): 100 INJECTION SUBCUTANEOUS at 05:29

## 2017-12-03 RX ADMIN — MEMANTINE HYDROCHLORIDE 10 MILLIGRAM(S): 10 TABLET ORAL at 18:00

## 2017-12-03 RX ADMIN — Medication 40 MILLIEQUIVALENT(S): at 16:09

## 2017-12-03 RX ADMIN — Medication 2 MILLILITER(S): at 11:06

## 2017-12-03 RX ADMIN — Medication 3 MILLILITER(S): at 11:06

## 2017-12-03 RX ADMIN — Medication 2 MILLILITER(S): at 23:20

## 2017-12-03 RX ADMIN — ZINC SULFATE TAB 220 MG (50 MG ZINC EQUIVALENT) 220 MILLIGRAM(S): 220 (50 ZN) TAB at 12:22

## 2017-12-03 RX ADMIN — Medication 3 MILLILITER(S): at 17:09

## 2017-12-03 RX ADMIN — Medication 2 MILLILITER(S): at 17:09

## 2017-12-03 NOTE — PHYSICAL THERAPY INITIAL EVALUATION ADULT - ADDITIONAL COMMENTS
As per private home health aide (Marine) by bedside, patient, prior to admission, was able to ambulate short distances indoors c rolling walker. Has a hospital bed at home, no patient lift, has a wheelchair. Has 5 stair steps to enter, but not used stairs for a long time as has not left home.

## 2017-12-03 NOTE — PHYSICAL THERAPY INITIAL EVALUATION ADULT - SKIN INTEGRITY
PT wound care initial evaluation performed with all wounds documented in flowsheet 2 4.0 A&I/pressure ulcer/healed wound/ulcers (specify)

## 2017-12-03 NOTE — PHYSICAL THERAPY INITIAL EVALUATION ADULT - GENERAL OBSERVATIONS, REHAB EVAL
Patient supine in bed c stable vital signs. PT wound care initial evaluation performed. PT wound care initial evaluation performed with all wounds documented in flowsheet 2 4.0 A&I.

## 2017-12-03 NOTE — PHYSICAL THERAPY INITIAL EVALUATION ADULT - IMPAIRMENTS FOUND, PT EVAL
aerobic capacity/endurance/arousal, attention, and cognition/decreased midline orientation/cognitive impairment/gait, locomotion, and balance/muscle strength/ventilation and respiration/gas exchange/integumentary integrity/gross motor/neuromotor development and sensory integration/joint integrity and mobility/ROM/sensory integrity

## 2017-12-03 NOTE — PHYSICAL THERAPY INITIAL EVALUATION ADULT - MODIFIED CLINICAL TEST OF SENSORY INTEGRATION IN BALANCE TEST
Barthel Index: Feeding Score _0__, Bathing Score _0__, Grooming Score _0__, Dressing Score _0__, Bowels Score _0__, Bladder Score _0__, Toilet Score _0__, Transfers Score _5__, Mobility Score _0__, Stairs Score _0__,     Total Score _5__

## 2017-12-03 NOTE — PHYSICAL THERAPY INITIAL EVALUATION ADULT - CRITERIA FOR SKILLED THERAPEUTIC INTERVENTIONS
impairments found/anticipated equipment needs at discharge/anticipated discharge recommendation/risk reduction/prevention/functional limitations in following categories/rehab potential

## 2017-12-03 NOTE — PROGRESS NOTE ADULT - SUBJECTIVE AND OBJECTIVE BOX
INTERVAL HPI/OVERNIGHT EVENTS:        REVIEW OF SYSTEMS:  CONSTITUTIONAL: alert  comfortable      MEDICATION:  acetylcysteine 10% Inhalation 2 milliLiter(s) Inhalation every 6 hours  ALBUTerol/ipratropium for Nebulization 3 milliLiter(s) Nebulizer every 6 hours  BACItracin   Ointment 1 Application(s) Topical two times a day  enoxaparin Injectable 30 milliGRAM(s) SubCutaneous every 24 hours  levoFLOXacin  Tablet 500 milliGRAM(s) Oral every 24 hours  morphine Concentrate 5 milliGRAM(s) Oral four times a day PRN  predniSONE   Tablet 30 milliGRAM(s) Oral daily    Vital Signs Last 24 Hrs  T(C): 37.1 (03 Dec 2017 06:14), Max: 37.6 (02 Dec 2017 17:14)  T(F): 98.8 (03 Dec 2017 06:14), Max: 99.7 (02 Dec 2017 17:14)  HR: 95 (03 Dec 2017 06:31) (60 - 98)  BP: 151/85 (03 Dec 2017 06:14) (116/78 - 151/85)  BP(mean): --  RR: 16 (03 Dec 2017 06:14) (16 - 16)  SpO2: 96% (03 Dec 2017 06:31) (95% - 100%)    PHYSICAL EXAM:  GENERAL: NAD, well-groomed, well-developed  EYES:  conjunctiva and sclera clear  ENMT:  Moist mucous membranes,   NECK: Supple, No JVD, Normal thyroid  NERVOUS SYSTEM:  Alert oriented   no  focal  deficits;   CHEST/LUNG: Clear    HEART: Regular rate and rhythm; No murmurs, rubs, or gallops  ABDOMEN: Soft, Nontender, Nondistended; Bowel sounds present  EXTREMITIES:  no  edema no  tenderness  SKIN: No rashes   LABS:                        12.1   7.9   )-----------( 162      ( 03 Dec 2017 08:40 )             38.2     12-03    144  |  105  |  29<H>  ----------------------------<  84  3.3<L>   |  35<H>  |  0.44<L>    Ca    8.7      03 Dec 2017 08:50    TPro  6.1  /  Alb  2.9<L>  /  TBili  0.5  /  DBili  x   /  AST  12<L>  /  ALT  22  /  AlkPhos  70  12-03        CAPILLARY BLOOD GLUCOSE          RADIOLOGY & ADDITIONAL TESTS:    Imaging reports  Personally Reviewed:  [x ] YES  [ ] NO    Consultant(s) Notes Reviewed:  [x ] YES  [ ] NO    Care Discussed with Consultants/Other Providers [x ] YES  [ ] NO  Assessment and Plan:   Problem/Plan - 1:  ·  Problem: Difficulty breathing.  Plan: continue  steroids  mucolytics  bronchdilators AB.     Problem/Plan - 2:  ·  Problem: Dementia.  Plan: off  aricept  off  namenda  DNR  for  hospice care.     Problem/Plan - 3:  ·  Problem: HTN (hypertension).  Plan: observation INTERVAL HPI/OVERNIGHT EVENTS:        REVIEW OF SYSTEMS:  CONSTITUTIONAL: alert  comfortable      MEDICATION:  acetylcysteine 10% Inhalation 2 milliLiter(s) Inhalation every 6 hours  ALBUTerol/ipratropium for Nebulization 3 milliLiter(s) Nebulizer every 6 hours  BACItracin   Ointment 1 Application(s) Topical two times a day  enoxaparin Injectable 30 milliGRAM(s) SubCutaneous every 24 hours  levoFLOXacin  Tablet 500 milliGRAM(s) Oral every 24 hours  morphine Concentrate 5 milliGRAM(s) Oral four times a day PRN  predniSONE   Tablet 30 milliGRAM(s) Oral daily    Vital Signs Last 24 Hrs  T(C): 37.1 (03 Dec 2017 06:14), Max: 37.6 (02 Dec 2017 17:14)  T(F): 98.8 (03 Dec 2017 06:14), Max: 99.7 (02 Dec 2017 17:14)  HR: 95 (03 Dec 2017 06:31) (60 - 98)  BP: 151/85 (03 Dec 2017 06:14) (116/78 - 151/85)  BP(mean): --  RR: 16 (03 Dec 2017 06:14) (16 - 16)  SpO2: 96% (03 Dec 2017 06:31) (95% - 100%)    PHYSICAL EXAM:  GENERAL: NAD, well-groomed, well-developed  EYES:  conjunctiva and sclera clear  ENMT:  Moist mucous membranes,   NECK: Supple, No JVD, Normal thyroid  NERVOUS SYSTEM:  Alert oriented   no  focal  deficits;   CHEST/LUNG: Clear    HEART: Regular rate and rhythm; No murmurs, rubs, or gallops  ABDOMEN: Soft, Nontender, Nondistended; Bowel sounds present  EXTREMITIES:  no  edema no  tenderness  SKIN: No rashes   LABS:                        12.1   7.9   )-----------( 162      ( 03 Dec 2017 08:40 )             38.2     12-03    144  |  105  |  29<H>  ----------------------------<  84  3.3<L>   |  35<H>  |  0.44<L>    Ca    8.7      03 Dec 2017 08:50    TPro  6.1  /  Alb  2.9<L>  /  TBili  0.5  /  DBili  x   /  AST  12<L>  /  ALT  22  /  AlkPhos  70  12-03        CAPILLARY BLOOD GLUCOSE          RADIOLOGY & ADDITIONAL TESTS:    Imaging reports  Personally Reviewed:  [x ] YES  [ ] NO    Consultant(s) Notes Reviewed:  [x ] YES  [ ] NO    Care Discussed with Consultants/Other Providers [x ] YES  [ ] NO  Assessment and Plan:   Problem/Plan - 1:  ·  Problem: Difficulty breathing.  Plan: continue  steroids  mucolytics  bronchdilators AB.     Problem/Plan - 2:  ·  Problem: Dementia.  Plan: Pt's  family  requests  namenda and  aricept  to  be  continued  DNR  for  hospice care.     Problem/Plan - 3:  ·  Problem: HTN (hypertension).  Plan: observation

## 2017-12-03 NOTE — PHYSICAL THERAPY INITIAL EVALUATION ADULT - PERTINENT HX OF CURRENT PROBLEM, REHAB EVAL
Patient brought in due to dyspnea. Chart reviewed and noted – blood chemistry: low K, high BUN, low Creatinine; imaging: CT Chest showing (R) lower lobe tree-in-bud , ground-glass appearance (infection vs inflammatory pattern). Patient is for hospice consult c plan for home hospice due to advanced dementia. Impression: general debility,  c limited mobility at baseline, c impaired integumentary integrity.

## 2017-12-04 VITALS
TEMPERATURE: 98 F | RESPIRATION RATE: 18 BRPM | HEART RATE: 101 BPM | OXYGEN SATURATION: 98 % | SYSTOLIC BLOOD PRESSURE: 116 MMHG | DIASTOLIC BLOOD PRESSURE: 82 MMHG

## 2017-12-04 LAB
ALBUMIN SERPL ELPH-MCNC: 2.5 G/DL — LOW (ref 3.3–5)
ALP SERPL-CCNC: 64 U/L — SIGNIFICANT CHANGE UP (ref 40–120)
ALT FLD-CCNC: 21 U/L — SIGNIFICANT CHANGE UP (ref 12–78)
ANION GAP SERPL CALC-SCNC: 4 MMOL/L — LOW (ref 5–17)
AST SERPL-CCNC: 11 U/L — LOW (ref 15–37)
BILIRUB SERPL-MCNC: 1 MG/DL — SIGNIFICANT CHANGE UP (ref 0.2–1.2)
BUN SERPL-MCNC: 25 MG/DL — HIGH (ref 7–23)
CALCIUM SERPL-MCNC: 8.6 MG/DL — SIGNIFICANT CHANGE UP (ref 8.5–10.1)
CHLORIDE SERPL-SCNC: 106 MMOL/L — SIGNIFICANT CHANGE UP (ref 96–108)
CO2 SERPL-SCNC: 33 MMOL/L — HIGH (ref 22–31)
CREAT SERPL-MCNC: 0.46 MG/DL — LOW (ref 0.5–1.3)
GLUCOSE SERPL-MCNC: 79 MG/DL — SIGNIFICANT CHANGE UP (ref 70–99)
HCT VFR BLD CALC: 36.8 % — SIGNIFICANT CHANGE UP (ref 34.5–45)
HGB BLD-MCNC: 11.6 G/DL — SIGNIFICANT CHANGE UP (ref 11.5–15.5)
MCHC RBC-ENTMCNC: 27.3 PG — SIGNIFICANT CHANGE UP (ref 27–34)
MCHC RBC-ENTMCNC: 31.4 GM/DL — LOW (ref 32–36)
MCV RBC AUTO: 87.1 FL — SIGNIFICANT CHANGE UP (ref 80–100)
PLATELET # BLD AUTO: 156 K/UL — SIGNIFICANT CHANGE UP (ref 150–400)
POTASSIUM SERPL-MCNC: 4 MMOL/L — SIGNIFICANT CHANGE UP (ref 3.5–5.3)
POTASSIUM SERPL-SCNC: 4 MMOL/L — SIGNIFICANT CHANGE UP (ref 3.5–5.3)
PROT SERPL-MCNC: 5.2 GM/DL — LOW (ref 6–8.3)
RBC # BLD: 4.23 M/UL — SIGNIFICANT CHANGE UP (ref 3.8–5.2)
RBC # FLD: 12.9 % — SIGNIFICANT CHANGE UP (ref 11–15)
SODIUM SERPL-SCNC: 143 MMOL/L — SIGNIFICANT CHANGE UP (ref 135–145)
WBC # BLD: 5.1 K/UL — SIGNIFICANT CHANGE UP (ref 3.8–10.5)
WBC # FLD AUTO: 5.1 K/UL — SIGNIFICANT CHANGE UP (ref 3.8–10.5)

## 2017-12-04 RX ORDER — ASPIRIN/CALCIUM CARB/MAGNESIUM 324 MG
1 TABLET ORAL
Qty: 0 | Refills: 0 | COMMUNITY

## 2017-12-04 RX ORDER — POTASSIUM CHLORIDE 20 MEQ
0 PACKET (EA) ORAL
Qty: 0 | Refills: 0 | COMMUNITY

## 2017-12-04 RX ORDER — BACITRACIN ZINC 500 UNIT/G
1 OINTMENT IN PACKET (EA) TOPICAL
Qty: 1 | Refills: 0 | OUTPATIENT
Start: 2017-12-04

## 2017-12-04 RX ORDER — DONEPEZIL HYDROCHLORIDE 10 MG/1
1 TABLET, FILM COATED ORAL
Qty: 0 | Refills: 0 | COMMUNITY

## 2017-12-04 RX ORDER — ZINC SULFATE TAB 220 MG (50 MG ZINC EQUIVALENT) 220 (50 ZN) MG
1 TAB ORAL
Qty: 30 | Refills: 0 | OUTPATIENT
Start: 2017-12-04 | End: 2018-01-03

## 2017-12-04 RX ORDER — IPRATROPIUM/ALBUTEROL SULFATE 18-103MCG
3 AEROSOL WITH ADAPTER (GRAM) INHALATION
Qty: 120 | Refills: 0 | OUTPATIENT
Start: 2017-12-04 | End: 2018-01-03

## 2017-12-04 RX ORDER — MEMANTINE HYDROCHLORIDE 10 MG/1
1 TABLET ORAL
Qty: 0 | Refills: 0 | COMMUNITY

## 2017-12-04 RX ORDER — ASCORBIC ACID 60 MG
1 TABLET,CHEWABLE ORAL
Qty: 0 | Refills: 0 | COMMUNITY
Start: 2017-12-04

## 2017-12-04 RX ADMIN — Medication 30 MILLIGRAM(S): at 06:12

## 2017-12-04 RX ADMIN — Medication 2 MILLILITER(S): at 17:18

## 2017-12-04 RX ADMIN — Medication 500 MILLIGRAM(S): at 12:03

## 2017-12-04 RX ADMIN — MEMANTINE HYDROCHLORIDE 10 MILLIGRAM(S): 10 TABLET ORAL at 17:57

## 2017-12-04 RX ADMIN — Medication 3 MILLILITER(S): at 11:55

## 2017-12-04 RX ADMIN — MEMANTINE HYDROCHLORIDE 10 MILLIGRAM(S): 10 TABLET ORAL at 06:12

## 2017-12-04 RX ADMIN — Medication 2 MILLILITER(S): at 11:55

## 2017-12-04 RX ADMIN — Medication 2 MILLILITER(S): at 05:23

## 2017-12-04 RX ADMIN — ENOXAPARIN SODIUM 30 MILLIGRAM(S): 100 INJECTION SUBCUTANEOUS at 06:12

## 2017-12-04 RX ADMIN — Medication 3 MILLILITER(S): at 05:23

## 2017-12-04 RX ADMIN — Medication 40 MILLIEQUIVALENT(S): at 00:16

## 2017-12-04 RX ADMIN — Medication 1 APPLICATION(S): at 17:57

## 2017-12-04 RX ADMIN — Medication 3 MILLILITER(S): at 17:18

## 2017-12-04 RX ADMIN — Medication 1 APPLICATION(S): at 06:14

## 2017-12-04 RX ADMIN — ZINC SULFATE TAB 220 MG (50 MG ZINC EQUIVALENT) 220 MILLIGRAM(S): 220 (50 ZN) TAB at 12:03

## 2017-12-04 NOTE — DISCHARGE NOTE ADULT - PATIENT PORTAL LINK FT
“You can access the [employer] Patient Portal, offered by [facility], by registering with the following website: [email or web address]”

## 2017-12-04 NOTE — PROGRESS NOTE ADULT - SUBJECTIVE AND OBJECTIVE BOX
Patient is a 86y old  Female who presents with a chief complaint of difficulty breathing  change  in  mental  status (30 Nov 2017 08:48)    HPI: Patient  with  end  stage  Alzheimer's  reported  with  difficulty  breathing, stridor at  home - evaluated  in  ER,  admitted  for  further w/u  and  treatment (30 Nov 2017 08:48)    Treated with steroids, bronchdilators, mucolytics, O2 and ABx empirically.    REVIEW OF SYSTEMS limited by dementia - as above.  Her daughter Gaby states she agrees with hospice at home, but is surprised her mother is "looking much better now."    PAST MEDICAL & SURGICAL HISTORY  HTN (hypertension)  Dementia  H/O partial thyroidectomy  H/O thyroidectomy    FAMILY HISTORY: noncontributory    SOCHX: Lives at home with family - speaks Filipino, but demented - a relative translated. Was able to participate in ADLs, ambulated in house with walker.  No tobacco,  -  alcohol    ALLERGIES  No Known Allergies    MEDICATIONS reviewed    PHYSICAL EXAM BMI 21.7  Constitutional - NAD, Comfortable in bed  HEENT - NCAT, EOMI, O2 NC (new) - taking it off in bed  Extremities - Grossly functional range of motion - testing limited.  Neurologic -                    Cognitive - Awake, Alert,      Speech grossly Intact per daughter     Language grossly  Intact     Motor - No focal deficits appreciated    RECENT LABS reviewed    IMAGING reviewed:  CXR - Right lower lobe clustered groundglass and tree-in-bud nodular  opacities - infection versus inflammatory mucoid impacted distal airways.  Head CT - no acute findings    STx rec puree consistency with thin liquids    FUNCTIONAL HISTORY was able to amb with RW    CURRENT FUNCTIONAL STATUS so far requires Max A     IMPRESSION: 86 F with dementia but was able to ambulate, being treated for difficulty breathing - improving?  Scheduled for home hospice DC tomorrow.    PLAN: As above

## 2017-12-04 NOTE — DISCHARGE NOTE ADULT - MEDICATION SUMMARY - MEDICATIONS TO TAKE
I will START or STAY ON the medications listed below when I get home from the hospital:    predniSONE 10 mg oral tablet  -- 3 tab(s) by mouth once a day for  2  days  then 2 tbs for 3  days  then 1  tab for  3 days and  D/C  -- Indication: For DIFFICULTY BREATHING    ipratropium-albuterol 0.5 mg-2.5 mg/3 mLinhalation solution  -- 3 milliliter(s) inhaled every 6 hours  -- Indication: For DIFFICULTY BREATHING    donepezil 10 mg oral tablet  -- 1 tab(s) by mouth once a day (at bedtime)  -- Indication: For Dementia    bacitracin 500 units/g topical ointment  -- 1 application on skin 2 times a day  -- Indication: For Dermatitis    zinc sulfate 220 mg oral capsule  -- 1 cap(s) by mouth once a day  -- Indication: For preventative tx    Namenda 10 mg oral tablet  -- 1 tab(s) by mouth 2 times a day  -- Indication: For Dementia    levoFLOXacin 500 mg oral tablet  -- 1 tab(s) by mouth every 24 hours for  3  days  -- Indication: For DIFFICULTY BREATHING    Cemill 500 oral tablet  -- 1 tab(s) by mouth once a day  -- Indication: For Supplement

## 2017-12-04 NOTE — PROGRESS NOTE ADULT - SUBJECTIVE AND OBJECTIVE BOX
INTERVAL HPI/OVERNIGHT EVENTS:        REVIEW OF SYSTEMS:  CONSTITUTIONAL:  alert  comfortable  no  complaints      MEDICATION:  acetylcysteine 10% Inhalation 2 milliLiter(s) Inhalation every 6 hours  ALBUTerol/ipratropium for Nebulization 3 milliLiter(s) Nebulizer every 6 hours  ascorbic acid 500 milliGRAM(s) Oral daily  BACItracin   Ointment 1 Application(s) Topical two times a day  donepezil 10 milliGRAM(s) Oral at bedtime  enoxaparin Injectable 30 milliGRAM(s) SubCutaneous every 24 hours  levoFLOXacin  Tablet 500 milliGRAM(s) Oral every 24 hours  memantine 10 milliGRAM(s) Oral two times a day  morphine Concentrate 5 milliGRAM(s) Oral four times a day PRN  predniSONE   Tablet 30 milliGRAM(s) Oral daily  zinc sulfate 220 milliGRAM(s) Oral daily    Vital Signs Last 24 Hrs  T(C): 37.1 (04 Dec 2017 06:06), Max: 37.1 (04 Dec 2017 06:06)  T(F): 98.8 (04 Dec 2017 06:06), Max: 98.8 (04 Dec 2017 06:06)  HR: 60 (04 Dec 2017 06:16) (60 - 88)  BP: 108/62 (04 Dec 2017 06:06) (108/62 - 130/72)  BP(mean): --  RR: 16 (04 Dec 2017 06:06) (16 - 17)  SpO2: 98% (04 Dec 2017 06:16) (93% - 99%)    PHYSICAL EXAM:  GENERAL: NAD, well-groomed, well-developed  EYES:  conjunctiva and sclera clear  ENMT:  Moist mucous membranes,   NECK: Supple, No JVD, Normal thyroid  NERVOUS SYSTEM:  Alert confused   moves  all  extr  CHEST/LUNG: Clear    HEART: Regular rate and rhythm; No murmurs, rubs, or gallops  ABDOMEN: Soft, Nontender, Nondistended; Bowel sounds present  EXTREMITIES:  no  edema no  tenderness  SKIN: No rashes   LABS:                        11.6   5.1   )-----------( 156      ( 04 Dec 2017 06:49 )             36.8     12-04    143  |  106  |  25<H>  ----------------------------<  79  4.0   |  33<H>  |  0.46<L>    Ca    8.6      04 Dec 2017 06:49    TPro  5.2<L>  /  Alb  2.5<L>  /  TBili  1.0  /  DBili  x   /  AST  11<L>  /  ALT  21  /  AlkPhos  64  12-04        CAPILLARY BLOOD GLUCOSE          RADIOLOGY & ADDITIONAL TESTS:    Imaging reports  Personally Reviewed:  [x ] YES  [ ] NO    Consultant(s) Notes Reviewed:  [x ] YES  [ ] NO    Care Discussed with Consultants/Other Providers [x ] YES  [ ] NO    Assessment and Plan:   Problem/Plan - 1:  ·  Problem: Difficulty breathing.  Plan: continue  steroids  bronchdilators AB. discharge  home    Problem/Plan - 2:  ·  Problem: Dementia.  Plan: Pt's  family  requests  namenda and  aricept  to  be  continued  DNR  for  hospice care.     Problem/Plan - 3:  ·  Problem: HTN  observation

## 2017-12-04 NOTE — DISCHARGE NOTE ADULT - HOSPITAL COURSE
patient  treated  empirically  with  steroids  AB  bronchodilators with  good  improvement  discussed with  faily  son  and  2  daughters  agree  to  hospice  DNR  continuation  of  Namenda  and  Aricept  requested.  discharged home  with  home  hospice

## 2017-12-07 DIAGNOSIS — Z51.5 ENCOUNTER FOR PALLIATIVE CARE: ICD-10-CM

## 2017-12-07 DIAGNOSIS — G30.9 ALZHEIMER'S DISEASE, UNSPECIFIED: ICD-10-CM

## 2017-12-07 DIAGNOSIS — R06.1 STRIDOR: ICD-10-CM

## 2017-12-07 DIAGNOSIS — F02.80 DEMENTIA IN OTHER DISEASES CLASSIFIED ELSEWHERE, UNSPECIFIED SEVERITY, WITHOUT BEHAVIORAL DISTURBANCE, PSYCHOTIC DISTURBANCE, MOOD DISTURBANCE, AND ANXIETY: ICD-10-CM

## 2017-12-07 DIAGNOSIS — Z28.21 IMMUNIZATION NOT CARRIED OUT BECAUSE OF PATIENT REFUSAL: ICD-10-CM

## 2017-12-07 DIAGNOSIS — R06.9 UNSPECIFIED ABNORMALITIES OF BREATHING: ICD-10-CM

## 2017-12-07 DIAGNOSIS — I10 ESSENTIAL (PRIMARY) HYPERTENSION: ICD-10-CM

## 2017-12-07 DIAGNOSIS — L89.154 PRESSURE ULCER OF SACRAL REGION, STAGE 4: ICD-10-CM

## 2017-12-07 DIAGNOSIS — Z79.82 LONG TERM (CURRENT) USE OF ASPIRIN: ICD-10-CM

## 2017-12-07 DIAGNOSIS — Z66 DO NOT RESUSCITATE: ICD-10-CM

## 2023-04-01 NOTE — DISCHARGE NOTE ADULT - NS DC ANGIO PCI YN
Admission Medication History Completed by Pharmacy    See [facility] Admission Navigator for allergy information, preferred outpatient pharmacy, prior to admission medications and immunization status.     Medication History Sources:     MAR from [facility]     Changes made to PTA medication list (reason):    Added: None    Deleted: None    Changed:   o Gabapentin 100 mg capsules - take 2 capsules (200 mg) by mouth 3 times daily -->  Gabapentin 100 mg capsule - take 3 capsules (300mg) by mouth 3 times daily     Additional Information:    Patient completed cefpodoxime 200 mg tablet - 1 tablet by mouth 2 times daily for 4 days on 03/24 for pneumonia, unspecified organism     Patient completed doxycyline hyclate 100mg tablet - take 1 tablet by mouth 2 times daily for 7 days on 03/15 for pneumonia      Warfarin dose confirmed on MAR from [facility] as 5 mg by mouth at bedtime last administered on 03/28 at 2000    Prior to Admission medications    Medication Sig Last Dose Taking? Auth Provider Long Term End Date   acetaminophen (TYLENOL) 500 MG tablet Take 1,000 mg by mouth every 8 hours as needed for mild pain 3/27/2023 at 0321 Yes Unknown, Entered By History     albuterol (PROVENTIL) (2.5 MG/3ML) 0.083% neb solution Take 1 vial (2.5 mg) by nebulization every 6 hours as needed for shortness of breath / dyspnea or wheezing 3/13/2023 at 1325 Yes Vic Boudreaux MD Yes    allopurinol (ZYLOPRIM) 300 MG tablet TAKE 1 TABLET(300 MG) BY MOUTH DAILY 3/28/2023 at 0800 Yes Vic Boudreaux MD Yes    alum hydroxide-mag trisilicate (GAVISCON) 80-14.2 MG CHEW chewable tablet Take 2 tablets by mouth every 6 hours as needed for indigestion 3/22/2023 at 1000 Yes Unknown, Entered By History     diclofenac (VOLTAREN) 1 % topical gel Apply 4 g topically 4 times daily 3/28/2023 at 2000 Yes Travon Mckay MD     ferrous sulfate (FEROSUL) 325 (65 Fe) MG tablet Take 1 tablet (325 mg) by mouth daily (with breakfast) 3/28/2023 at  0800 Yes Vic Boudreaux MD     furosemide (LASIX) 20 MG tablet Take 10 mg by mouth daily 3/28/2023 at 0800 Yes Reported, Patient No    gabapentin (NEURONTIN) 100 MG capsule Take 2 capsules (200 mg) by mouth 3 times daily  Patient taking differently: Take 300 mg by mouth 3 times daily 3/28/2023 at 2000 Yes Darshana Calderon, DO Yes    HYDROmorphone (DILAUDID) 2 MG tablet Take 2 mg by mouth every 4 hours as needed 3/28/2023 at 1824 Yes Reported, Patient     isosorbide mononitrate (ISMO/MONOKET) 20 MG tablet Take 1 tablet (20 mg) by mouth 2 times daily for 30 days 3/28/2023 at 2000 Yes Darshana Calderon,  Yes 4/19/23   lidocaine (XYLOCAINE) 5 % external ointment Apply topically 3 times daily as needed for moderate pain (4-6) (apply to urethral meatus) More than a month Yes Vic Boudreaux MD     metoprolol tartrate (LOPRESSOR) 25 MG tablet Take 0.5 tablets (12.5 mg) by mouth 2 times daily for 30 days 3/28/2023 at 2000 Yes Darshana Calderon, DO Yes 4/19/23   miconazole (MICATIN) 2 % external powder Apply topically 2 times daily 3/28/2023 at 2000 Yes René Davis PA-C     pantoprazole (PROTONIX) 2 mg/mL SUSP suspension Take 40 mg by mouth 2 times daily 0700/1600 3/28/2023 at 1600 Yes Unknown, Entered By History     pramipexole (MIRAPEX) 0.25 MG tablet TAKE UP TO 3 TABLETS BY MOUTH DAILY PRN  Patient taking differently: Take 0.25 mg by mouth 3 times daily as needed (RLS) More than a month Yes Vic Boudreaux MD Yes    sertraline (ZOLOFT) 50 MG tablet Take 1 tablet (50 mg) by mouth 2 times daily 3/28/2023 at 0800 Yes Vic Boudreaux MD Yes    simethicone (MYLICON) 80 MG chewable tablet Take 1 tablet (80 mg) by mouth every 6 hours as needed for cramping More than a month Yes Masterman, René M, PA-C     SUMAtriptan (IMITREX) 25 MG tablet Take 25 mg by mouth at onset of headache for migraine PRN More than a month Yes Reported, Patient No    thiamine (B-1) 100 MG tablet  Take 1 tablet (100 mg) by mouth daily 3/28/2023 at 0800 Yes Vic Boudreaux MD     trospium (SANCTURA) 20 MG tablet Take 1 tablet (20 mg) by mouth 2 times daily (before meals) 3/28/2023 at 1600 Yes Scooter Carr MD     warfarin ANTICOAGULANT (COUMADIN) 5 MG tablet Take 1 tablet (5 mg) by mouth daily 3/28/2023 at PM Yes René Davis, PATienC No    Nutritional Supplements (ENSURE CLEAR PO) Take 240 mLs by mouth 3 times daily (with meals) 0800/1200/1730 3/28/2023 at 1730  Unknown, Entered By History     Nutritional Supplements (ENSURE CLEAR PO) Take 240 mLs by mouth daily as needed (decreased oral intake and as requested)   Unknown, Entered By History             Date completed: 03/31/23    Medication history completed by: Magdiel Gonzalez             no

## 2023-10-20 NOTE — ED ADULT NURSE NOTE - CAS TRG GEN SKIN CONDITION
Warm/Dry Nsaids Counseling: NSAID Counseling: I discussed with the patient that NSAIDs should be taken with food. Prolonged use of NSAIDs can result in the development of stomach ulcers.  Patient advised to stop taking NSAIDs if abdominal pain occurs.  The patient verbalized understanding of the proper use and possible adverse effects of NSAIDs.  All of the patient's questions and concerns were addressed.

## 2025-01-17 NOTE — PHYSICAL THERAPY INITIAL EVALUATION ADULT - PLANNED THERAPY INTERVENTIONS, PT EVAL
No strengthening/bed mobility training/gait training/transfer training/stretching/neuromuscular re-education/postural re-education/ROM/balance training

## 2025-06-13 NOTE — PATIENT PROFILE ADULT. - MEDICATIONS BROUGHT TO HOSPITAL, PROFILE
Writer called pt's therapist Kiley Allen intake team at [facility] ([phone]) to provide information regarding discharge. They shared her substance counselor can continue to see her however they do not have availability for mental health counseling. Intake member shared needing clinical info prior to accepting her back. Writer shared she would inform social work and be in contact. 
no